# Patient Record
Sex: FEMALE | Race: BLACK OR AFRICAN AMERICAN
[De-identification: names, ages, dates, MRNs, and addresses within clinical notes are randomized per-mention and may not be internally consistent; named-entity substitution may affect disease eponyms.]

---

## 2020-04-29 ENCOUNTER — HOSPITAL ENCOUNTER (EMERGENCY)
Dept: HOSPITAL 72 - EMR | Age: 35
Discharge: HOME | End: 2020-04-29
Payer: COMMERCIAL

## 2020-04-29 VITALS — DIASTOLIC BLOOD PRESSURE: 65 MMHG | SYSTOLIC BLOOD PRESSURE: 126 MMHG

## 2020-04-29 VITALS — SYSTOLIC BLOOD PRESSURE: 127 MMHG | DIASTOLIC BLOOD PRESSURE: 89 MMHG

## 2020-04-29 VITALS — WEIGHT: 215 LBS | BODY MASS INDEX: 36.7 KG/M2 | HEIGHT: 64 IN

## 2020-04-29 DIAGNOSIS — Z97.5: ICD-10-CM

## 2020-04-29 DIAGNOSIS — R10.30: Primary | ICD-10-CM

## 2020-04-29 DIAGNOSIS — L73.2: ICD-10-CM

## 2020-04-29 DIAGNOSIS — E11.9: ICD-10-CM

## 2020-04-29 LAB
ADD MANUAL DIFF: NO
ALBUMIN SERPL-MCNC: 4 G/DL (ref 3.4–5)
ALBUMIN/GLOB SERPL: 1.2 {RATIO} (ref 1–2.7)
ALP SERPL-CCNC: 88 U/L (ref 46–116)
ALT SERPL-CCNC: 28 U/L (ref 12–78)
ANION GAP SERPL CALC-SCNC: 10 MMOL/L (ref 5–15)
APPEARANCE UR: CLEAR
APTT PPP: YELLOW S
AST SERPL-CCNC: 19 U/L (ref 15–37)
BASOPHILS NFR BLD AUTO: 1.1 % (ref 0–2)
BILIRUB SERPL-MCNC: 0.3 MG/DL (ref 0.2–1)
BUN SERPL-MCNC: 14 MG/DL (ref 7–18)
CALCIUM SERPL-MCNC: 9 MG/DL (ref 8.5–10.1)
CHLORIDE SERPL-SCNC: 108 MMOL/L (ref 98–107)
CO2 SERPL-SCNC: 28 MMOL/L (ref 21–32)
CREAT SERPL-MCNC: 0.9 MG/DL (ref 0.55–1.3)
EOSINOPHIL NFR BLD AUTO: 2 % (ref 0–3)
ERYTHROCYTE [DISTWIDTH] IN BLOOD BY AUTOMATED COUNT: 13.7 % (ref 11.6–14.8)
GLOBULIN SER-MCNC: 3.4 G/DL
GLUCOSE UR STRIP-MCNC: NEGATIVE MG/DL
HCT VFR BLD CALC: 41.4 % (ref 37–47)
HGB BLD-MCNC: 13.4 G/DL (ref 12–16)
KETONES UR QL STRIP: NEGATIVE
LEUKOCYTE ESTERASE UR QL STRIP: (no result)
LYMPHOCYTES NFR BLD AUTO: 28 % (ref 20–45)
MCV RBC AUTO: 88 FL (ref 80–99)
MONOCYTES NFR BLD AUTO: 5.8 % (ref 1–10)
NEUTROPHILS NFR BLD AUTO: 63.1 % (ref 45–75)
NITRITE UR QL STRIP: NEGATIVE
PH UR STRIP: 6 [PH] (ref 4.5–8)
PLATELET # BLD: 399 K/UL (ref 150–450)
POTASSIUM SERPL-SCNC: 4.2 MMOL/L (ref 3.5–5.1)
PROT UR QL STRIP: NEGATIVE
RBC # BLD AUTO: 4.68 M/UL (ref 4.2–5.4)
SODIUM SERPL-SCNC: 145 MMOL/L (ref 136–145)
SP GR UR STRIP: 1.02 (ref 1–1.03)
UROBILINOGEN UR-MCNC: 4 MG/DL (ref 0–1)
WBC # BLD AUTO: 12.8 K/UL (ref 4.8–10.8)

## 2020-04-29 PROCEDURE — 36415 COLL VENOUS BLD VENIPUNCTURE: CPT

## 2020-04-29 PROCEDURE — 83690 ASSAY OF LIPASE: CPT

## 2020-04-29 PROCEDURE — 99284 EMERGENCY DEPT VISIT MOD MDM: CPT

## 2020-04-29 PROCEDURE — 85025 COMPLETE CBC W/AUTO DIFF WBC: CPT

## 2020-04-29 PROCEDURE — 80053 COMPREHEN METABOLIC PANEL: CPT

## 2020-04-29 PROCEDURE — 81003 URINALYSIS AUTO W/O SCOPE: CPT

## 2020-04-29 PROCEDURE — 81025 URINE PREGNANCY TEST: CPT

## 2020-04-29 PROCEDURE — 84702 CHORIONIC GONADOTROPIN TEST: CPT

## 2020-04-29 PROCEDURE — 76856 US EXAM PELVIC COMPLETE: CPT

## 2020-04-29 NOTE — DIAGNOSTIC IMAGING REPORT
EXAM:

  US Pelvis Transabdominal, Complete

 

CLINICAL HISTORY:

  ABD PAIN

 

TECHNIQUE:

  Real-time complete transabdominal pelvic ultrasound with image 

documentation.

 

COMPARISON:

  No relevant prior studies available.

 

FINDINGS:

  Limitations:  Patient refused endovaginal exam, limiting study.

  Uterus/cervix:  IUD in the endometrial canal.  Uterus 8.6 x 6.5 x 5 cm  

Endometrium 0.5 cm.  No myometrial mass.

  Right ovary:  Ovaries obscured due to bowel gas shadowing.  Normal 

blood flow.

  Left ovary:  See above.

  Free fluid:  Likely physiologic pelvic fluid.

  Bladder:  Unremarkable as visualized.  Wall is normal thickness for 

degree of distention.

 

IMPRESSION:     

1.  Patient refused endovaginal exam, limiting study.

2.  IUD in the endometrial canal.

3.  Ovaries obscured due to bowel gas shadowing.

4.  No acute abnormality definitively identified to account for patient 

presentation.

## 2020-04-29 NOTE — EMERGENCY ROOM REPORT
History of Present Illness


General


Chief Complaint:  Abdominal Pain


Source:  Patient





Present Illness


HPI


34-year-old female presents with abdominal cramps that come and go severity is 

mild intermittent, no vaginal discharge no dysuria patient thought her IUD was 

out of place, 1 to make sure it was still in place has been ongoing for a few 

days, additionally patient also reports intermittent abscesses of the left 

axilla that come and go so far they are resolved she states that maybe there 

are some bumps there with some drainage no fevers no chills patient presents 

for evaluation


Allergies:  


Coded Allergies:  


     NO KNOWN DRUG ALLERGIES (Unverified  Allergy, Unknown, 2/17/15)





COVID-19 Screening


Contact w/high risk pt:  No


Recent Travel to affected area:  No


Experienced COVID-19 symptoms?:  No





Patient History


Past Medical History:  see triage record


Last Menstrual Period:  20


Pregnant Now:  No - IUD


:  5


Para:  1


Reviewed Nursing Documentation:  PMH: Agreed; PSxH: Agreed





Nursing Documentation-PMH


Past Medical History:  No Stated History


Hx Cardiac Problems:  No - SICKLE CELL


Hx Diabetes:  Yes





Review of Systems


All Other Systems:  negative except mentioned in HPI





Physical Exam





Vital Signs








  Date Time  Temp Pulse Resp B/P (MAP) Pulse Ox O2 Delivery O2 Flow Rate FiO2


 


20 19:32 98.2 67 18 127/89 (102) 99 Room Air  








Sp02 EP Interpretation:  reviewed, normal


General Appearance:  well appearing, no apparent distress, alert


Head:  normocephalic, atraumatic


Eyes:  bilateral eye PERRL, bilateral eye EOMI


ENT:  uvula midline, moist mucus membranes


Neck:  supple, thyroid normal, supple/symm/no masses


Respiratory:  lungs clear, no respiratory distress, no retraction, no accessory 

muscle use


Cardiovascular #1:  normal peripheral pulses, regular rate, rhythm, no edema, 

no gallop, no murmur


Gastrointestinal:  non tender, soft, no guarding, no rebound


Musculoskeletal:  normal inspection


Neurologic:  alert, oriented x3


Psychiatric:  mood/affect normal


Skin:  no rash, other - Axilla: Superficial scars noted no evidence of 

collections to be drained patient reports mild pain to palpation





Medical Decision Making


Diagnostic Impression:  


 Primary Impression:  


 Hidradenitis suppurativa of left axilla


 Additional Impression:  


 Abdominal pain


 Qualified Codes:  R10.30 - Lower abdominal pain, unspecified


ER Course


34-year-old female presents with abdominal cramps concern for an IUD 

displacement, ultrasound shows no evidence of IUD displacement, counseled 

patient to check her strings additionally patient with hidradenitis supra T of 

of left axilla, will provide patient with antibiotics no obvious area to drain 

disposition home with return precautions counseled patient





Laboratory Tests








Test


  20


19:45


 


White Blood Count


  12.8 K/UL


(4.8-10.8)  H


 


Red Blood Count


  4.68 M/UL


(4.20-5.40)


 


Hemoglobin


  13.4 G/DL


(12.0-16.0)


 


Hematocrit


  41.4 %


(37.0-47.0)


 


Mean Corpuscular Volume 88 FL (80-99)  


 


Mean Corpuscular Hemoglobin


  28.5 PG


(27.0-31.0)


 


Mean Corpuscular Hemoglobin


Concent 32.3 G/DL


(32.0-36.0)


 


Red Cell Distribution Width


  13.7 %


(11.6-14.8)


 


Platelet Count


  399 K/UL


(150-450)


 


Mean Platelet Volume


  7.8 FL


(6.5-10.1)


 


Neutrophils (%) (Auto)


  63.1 %


(45.0-75.0)


 


Lymphocytes (%) (Auto)


  28.0 %


(20.0-45.0)


 


Monocytes (%) (Auto)


  5.8 %


(1.0-10.0)


 


Eosinophils (%) (Auto)


  2.0 %


(0.0-3.0)


 


Basophils (%) (Auto)


  1.1 %


(0.0-2.0)


 


Urine Color Yellow  


 


Urine Appearance Clear  


 


Urine pH 6 (4.5-8.0)  


 


Urine Specific Gravity


  1.020


(1.005-1.035)


 


Urine Protein


  Negative


(NEGATIVE)


 


Urine Glucose (UA)


  Negative


(NEGATIVE)


 


Urine Ketones


  Negative


(NEGATIVE)


 


Urine Blood


  5+ (NEGATIVE)


H


 


Urine Nitrite


  Negative


(NEGATIVE)


 


Urine Bilirubin


  Negative


(NEGATIVE)


 


Urine Urobilinogen


  4 MG/DL


(0.0-1.0)  H


 


Urine Leukocyte Esterase


  1+ (NEGATIVE)


H


 


Urine RBC


  5-10 /HPF (0 -


2)  H


 


Urine WBC


  2-4 /HPF (0 -


2)


 


Urine Squamous Epithelial


Cells Few /LPF


(NONE/OCC)


 


Urine Bacteria


  Few /HPF


(NONE)


 


Urine HCG, Qualitative


  Negative


(NEGATIVE)


 


Sodium Level


  145 MMOL/L


(136-145)


 


Potassium Level


  4.2 MMOL/L


(3.5-5.1)


 


Chloride Level


  108 MMOL/L


()  H


 


Carbon Dioxide Level


  28 MMOL/L


(21-32)


 


Anion Gap


  10 mmol/L


(5-15)


 


Blood Urea Nitrogen


  14 mg/dL


(7-18)


 


Creatinine


  0.9 MG/DL


(0.55-1.30)


 


Estimated Glomerular


Filtration Rate > 60 mL/min


(>60)


 


Glucose Level


  86 MG/DL


()


 


Calcium Level


  9.0 MG/DL


(8.5-10.1)


 


Total Bilirubin


  0.3 MG/DL


(0.2-1.0)


 


Aspartate Amino Transferase


(AST) 19 U/L (15-37)


 


 


Alanine Aminotransferase (ALT)


  28 U/L (12-78)


 


 


Alkaline Phosphatase


  88 U/L


()


 


Total Protein


  7.4 G/DL


(6.4-8.2)


 


Albumin


  4.0 G/DL


(3.4-5.0)


 


Globulin 3.4 g/dL  


 


Albumin/Globulin Ratio 1.2 (1.0-2.7)  


 


Lipase


  337 U/L


()


 


Human Chorionic Gonadotropin,


Quant 1 mIU/mL (1-6)


 








CT/MRI/US Diagnostic Results


CT/MRI/US Diagnostic Results :  


   Impression


Final Report


EXAM:


US Pelvis Transabdominal, Complete





CLINICAL HISTORY:


ABD PAIN





TECHNIQUE:


Real-time complete transabdominal pelvic ultrasound with image documentation.





COMPARISON:


No relevant prior studies available.





FINDINGS:


Limitations: Patient refused endovaginal exam, limiting study.


Uterus/cervix: IUD in the endometrial canal. Uterus 8.6 x 6.5 x 5 cm 

Endometrium 0.5 cm. No myometrial mass.


Right ovary: Ovaries obscured due to bowel gas shadowing. Normal blood flow.


Left ovary: See above.


Free fluid: Likely physiologic pelvic fluid.


Bladder: Unremarkable as visualized. Wall is normal thickness for degree of 

distention.





IMPRESSION:


1. Patient refused endovaginal exam, limiting study.


2. IUD in the endometrial canal.


3. Ovaries obscured due to bowel gas shadowing.


4. No acute abnormality definitively identified to account for patient 

presentation.





Radiologist: Saurabh Jennings MD   Electronically Signed: 20 20:54   Phone

: [phone]


Study ready at 20:48 and initial results transmitted at 20:54





Last Vital Signs








  Date Time  Temp Pulse Resp B/P (MAP) Pulse Ox O2 Delivery O2 Flow Rate FiO2


 


20 19:40 98.2 67 18 127/89 99 Room Air  








Disposition:  HOME, SELF-CARE


Condition:  Stable


Scripts


Trimethoprim/Sulfamethoxazole 160/800* (BACTRIM DS TABLET*) 1 Each Tablet


1 TAB ORAL Q12H, #20 TAB 0 Refills


   Prov: Julio Cesar Franco MD         20


Referrals:  


NOT CHOSEN IPA/MD,REFERRING (PCP)











[facility]





[facility]


Patient Instructions:  Hidradenitis Suppurativa, Levonorgestrel intrauterine 

device (IUD)





Additional Instructions:  


The patient was provided with discharge instructions, notified to follow-up 

with a primary care doctor and or specialist in the next 24-48 hours, and to 

return to the ED if they have worsening of their symptoms. 





Please note that this report is being documented using DRAGON technology.


This can lead to erroneous entry secondary to incorrect interpretation by the 

dictating instrument.











Julio Cesar Franco MD 2020 20:04

## 2020-04-29 NOTE — NUR
ED Nurse Note:



Pt cleared by ERMD for discharge.  DC instructions/prescription was given and 
explained to pt and verbalized understanding of teachings. All medical deviecs 
such as ID band and IV line removed. Pt is AAO x4, ambulatory and left with all 
personal belongings.